# Patient Record
Sex: FEMALE | Race: WHITE | NOT HISPANIC OR LATINO | URBAN - NONMETROPOLITAN AREA
[De-identification: names, ages, dates, MRNs, and addresses within clinical notes are randomized per-mention and may not be internally consistent; named-entity substitution may affect disease eponyms.]

---

## 2018-08-20 PROBLEM — H52.223: Noted: 2018-08-20

## 2018-08-20 PROBLEM — Z01.01: Noted: 2018-08-20

## 2018-08-20 PROBLEM — Z46.0: Noted: 2018-08-20

## 2018-08-20 PROBLEM — H52.13: Noted: 2018-08-20

## 2018-08-20 PROBLEM — H25.813: Noted: 2018-08-20

## 2018-08-20 PROBLEM — H52.4: Noted: 2018-08-20

## 2019-09-27 ENCOUNTER — IMPORTED ENCOUNTER (OUTPATIENT)
Dept: URBAN - NONMETROPOLITAN AREA CLINIC 1 | Facility: CLINIC | Age: 64
End: 2019-09-27

## 2019-09-27 PROCEDURE — 99213 OFFICE O/P EST LOW 20 MIN: CPT

## 2019-09-27 PROCEDURE — 92310 CONTACT LENS FITTING OU: CPT

## 2019-09-27 PROCEDURE — V2520 CONTACT LENS HYDROPHILIC: HCPCS

## 2019-09-27 NOTE — PATIENT DISCUSSION
Myopia Astigmatism Presbyopia-Discussed diagnosis with patient. -Explained that people who are myopic are at a higher risk for developing RD/RT and reviewed associated S&S.-Pt to contact our office if symptoms develop. CL wear-CLs fit and center well.-Stressed that patient should not sleep in CL. -Updated CL Rx given. -CL care and precautions given. Cataract OU-Reviewed symptoms of advancing cataract growth such as glare and halos and decreased vision.-Continue to monitor for now. Pt will notify us if any new symptoms develop. Corneal Scar OS-continue to monitor; Dr's Notes: dr. Anette Huerta [address] [facility] [id number]

## 2019-12-10 ENCOUNTER — IMPORTED ENCOUNTER (OUTPATIENT)
Dept: URBAN - NONMETROPOLITAN AREA CLINIC 1 | Facility: CLINIC | Age: 64
End: 2019-12-10

## 2019-12-10 PROBLEM — H52.13: Noted: 2019-12-10

## 2019-12-10 PROBLEM — H25.813: Noted: 2019-12-10

## 2019-12-10 PROBLEM — H52.223: Noted: 2019-12-10

## 2019-12-10 PROBLEM — H52.4: Noted: 2019-12-10

## 2019-12-10 PROBLEM — H16.042: Noted: 2019-12-10

## 2019-12-10 PROCEDURE — 92012 INTRM OPH EXAM EST PATIENT: CPT

## 2019-12-10 NOTE — PATIENT DISCUSSION
Corneal Ulcer OS-  discussed findings w/patient-  d/c all CL wear at this time -  start Tobradex QID OS x 1 week then RTC -  RTC 1 week f/u or prn; Dr's Notes: dr. Jason Reese [address] [place], [place] [id number]

## 2021-10-29 ENCOUNTER — IMPORTED ENCOUNTER (OUTPATIENT)
Dept: URBAN - NONMETROPOLITAN AREA CLINIC 1 | Facility: CLINIC | Age: 66
End: 2021-10-29

## 2021-10-29 PROBLEM — H52.4: Noted: 2021-10-29

## 2021-10-29 PROBLEM — H52.223: Noted: 2021-10-29

## 2021-10-29 PROBLEM — H25.813: Noted: 2021-10-29

## 2021-10-29 PROBLEM — H52.13: Noted: 2021-10-29

## 2021-10-29 PROCEDURE — V2521 CNTCT LENS HYDROPHILIC TORIC: HCPCS

## 2021-10-29 PROCEDURE — 92310 CONTACT LENS FITTING OU: CPT

## 2021-10-29 PROCEDURE — S0621 ROUTINE OPHTHALMOLOGICAL EXA: HCPCS

## 2021-10-29 NOTE — PATIENT DISCUSSION
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 Luna [facility] [id number] [address] [place] [address] 10/29/2021DFE 10/29/2021

## 2022-04-15 ASSESSMENT — VISUAL ACUITY
OU_SC: 20/25
OS_SC: 20/25
OD_SC: 20/30
OD_SC: 20/25
OS_SC: 20/30+
OS_SC: 20/30
OD_SC: 20/40
OD_SC: 20/25
OU_SC: 20/25
OU_SC: 20/30
OS_SC: 20/25

## 2022-04-15 ASSESSMENT — TONOMETRY
OS_IOP_MMHG: 15
OD_IOP_MMHG: 15
OS_IOP_MMHG: 14
OS_IOP_MMHG: 14
OD_IOP_MMHG: 15
OD_IOP_MMHG: 13